# Patient Record
Sex: FEMALE | Race: WHITE
[De-identification: names, ages, dates, MRNs, and addresses within clinical notes are randomized per-mention and may not be internally consistent; named-entity substitution may affect disease eponyms.]

---

## 2017-06-08 ENCOUNTER — HOSPITAL ENCOUNTER (OUTPATIENT)
Dept: HOSPITAL 35 - HYPER | Age: 77
End: 2017-06-08
Attending: EMERGENCY MEDICINE
Payer: COMMERCIAL

## 2017-06-08 DIAGNOSIS — R60.9: ICD-10-CM

## 2017-06-08 DIAGNOSIS — I10: ICD-10-CM

## 2017-06-08 DIAGNOSIS — L97.821: ICD-10-CM

## 2017-06-08 DIAGNOSIS — Z72.89: ICD-10-CM

## 2017-06-08 DIAGNOSIS — I87.2: Primary | ICD-10-CM

## 2017-06-19 ENCOUNTER — HOSPITAL ENCOUNTER (OUTPATIENT)
Dept: HOSPITAL 35 - HYPER | Age: 77
End: 2017-06-19
Attending: EMERGENCY MEDICINE
Payer: COMMERCIAL

## 2017-06-19 DIAGNOSIS — L97.821: ICD-10-CM

## 2017-06-19 DIAGNOSIS — I10: ICD-10-CM

## 2017-06-19 DIAGNOSIS — Z72.89: ICD-10-CM

## 2017-06-19 DIAGNOSIS — I87.2: Primary | ICD-10-CM

## 2017-07-03 ENCOUNTER — HOSPITAL ENCOUNTER (OUTPATIENT)
Dept: HOSPITAL 35 - HYPER | Age: 77
End: 2017-07-03
Attending: PREVENTIVE MEDICINE
Payer: COMMERCIAL

## 2017-07-03 DIAGNOSIS — I10: ICD-10-CM

## 2017-07-03 DIAGNOSIS — L97.321: ICD-10-CM

## 2017-07-03 DIAGNOSIS — L97.821: ICD-10-CM

## 2017-07-03 DIAGNOSIS — Z72.89: ICD-10-CM

## 2017-07-03 DIAGNOSIS — Z90.710: ICD-10-CM

## 2017-07-03 DIAGNOSIS — Z96.653: ICD-10-CM

## 2017-07-03 DIAGNOSIS — I87.2: Primary | ICD-10-CM

## 2017-07-24 ENCOUNTER — HOSPITAL ENCOUNTER (OUTPATIENT)
Dept: HOSPITAL 35 - HYPER | Age: 77
End: 2017-07-24
Attending: PREVENTIVE MEDICINE
Payer: COMMERCIAL

## 2017-07-24 DIAGNOSIS — Z90.710: ICD-10-CM

## 2017-07-24 DIAGNOSIS — Z96.653: ICD-10-CM

## 2017-07-24 DIAGNOSIS — L97.321: ICD-10-CM

## 2017-07-24 DIAGNOSIS — I87.2: Primary | ICD-10-CM

## 2017-07-24 DIAGNOSIS — I10: ICD-10-CM

## 2017-08-14 ENCOUNTER — HOSPITAL ENCOUNTER (OUTPATIENT)
Dept: HOSPITAL 35 - HYPER | Age: 77
End: 2017-08-14
Attending: PREVENTIVE MEDICINE
Payer: COMMERCIAL

## 2017-08-14 DIAGNOSIS — L97.321: ICD-10-CM

## 2017-08-14 DIAGNOSIS — R60.9: ICD-10-CM

## 2017-08-14 DIAGNOSIS — I87.2: Primary | ICD-10-CM

## 2017-08-14 DIAGNOSIS — I10: ICD-10-CM

## 2017-08-14 DIAGNOSIS — Z72.89: ICD-10-CM

## 2017-08-14 DIAGNOSIS — Z90.710: ICD-10-CM

## 2017-09-11 ENCOUNTER — HOSPITAL ENCOUNTER (OUTPATIENT)
Dept: HOSPITAL 35 - HYPER | Age: 77
End: 2017-09-11
Attending: PREVENTIVE MEDICINE
Payer: COMMERCIAL

## 2017-09-11 DIAGNOSIS — L97.321: ICD-10-CM

## 2017-09-11 DIAGNOSIS — Z72.89: ICD-10-CM

## 2017-09-11 DIAGNOSIS — I10: ICD-10-CM

## 2017-09-11 DIAGNOSIS — Z96.653: ICD-10-CM

## 2017-09-11 DIAGNOSIS — Z90.710: ICD-10-CM

## 2017-09-11 DIAGNOSIS — I87.2: Primary | ICD-10-CM

## 2019-01-26 ENCOUNTER — HOSPITAL ENCOUNTER (EMERGENCY)
Dept: HOSPITAL 63 - ER | Age: 79
Discharge: HOME | End: 2019-01-26
Payer: MEDICARE

## 2019-01-26 VITALS — DIASTOLIC BLOOD PRESSURE: 63 MMHG | SYSTOLIC BLOOD PRESSURE: 126 MMHG

## 2019-01-26 VITALS — WEIGHT: 135 LBS | BODY MASS INDEX: 24.84 KG/M2 | HEIGHT: 62 IN

## 2019-01-26 DIAGNOSIS — R21: Primary | ICD-10-CM

## 2019-01-26 PROCEDURE — 99283 EMERGENCY DEPT VISIT LOW MDM: CPT

## 2019-01-26 NOTE — ED.ADGEN
Adult General


Chief Complaint


Chief Complaint


skin rash





HPI


HPI


Patient is a 78 year old female presents with ocular rash/rash. Patient had 

symptoms first appeared to 3 weeks week ago and was seen by her PCP placed on 

prednisone taper and was subsequently seen by her ophthalmologist. Rash  

resolved but then returned after completing his own. Patient denies pain, 

itching, change of vision. No systemic symptoms. No new medications or change 

medications other than prednisone. No other acute symptoms or complaints.[]





Review of Systems


Review of Systems


Review symptoms as per history of present illness. All other review symptoms 

are negative


All other systems were reviewed and found to be within normal limits, except as 

documented in this note.





Current Medications


Current Medications





Current Medications








 Medications


  (Trade)  Dose


 Ordered  Sig/Melecio  Start Time


 Stop Time Status Last Admin


Dose Admin


 


 Prednisone


  (Prednisone)  60 mg  1X  ONCE  1/26/19 17:15


 1/26/19 17:16   














Allergies


Allergies





Allergies








Coded Allergies Type Severity Reaction Last Updated Verified


 


  No Known Drug Allergies    1/26/19 No











Physical Exam


Physical Exam





Constitutional: Well developed, well nourished, no acute distress, non-toxic 

appearance. []


HENT: Normocephalic, atraumatic, bilateral external ears normal, oropharynx 

moist, no oral exudates, nose normal. []


Eyes: PERRLA, EOMI, bilateral periocular edematous rash with minimal 

induration. No weeping or drainage. No ocular pain with eye movement. [] 


Neck: Normal range of motion, no tenderness, supple, no stridor. [] 


Cardiovascular:Heart rate regular rhythm, no murmur []


Lungs & Thorax:  Bilateral breath sounds clear to auscultation [)


Extremities: No tenderness, no cyanosis, no clubbing, ROM intact, no edema. [] 


Neurologic: Alert and oriented X 3, normal motor function, normal sensory 

function, no focal deficits noted. []


Psychologic: Affect normal, judgement normal, mood normal. []





Current Patient Data


Vital Signs





 Vital Signs








  Date Time  Temp Pulse Resp B/P (MAP) Pulse Ox O2 Delivery O2 Flow Rate FiO2


 


1/26/19 16:40 97.5 65 16  98 Room Air  











EKG


EKG


[]





Radiology/Procedures


Radiology/Procedures


[]





Course & Med Decision Making


Course & Med Decision Making


Pertinent Labs and Imaging studies reviewed. (See chart for details)





[Periocular rash without ocular involvement. Patient asymptomatic in the 

emergency department. Will resume steroids and have follow-up with PCP for 

further management.]





Final Impression


Final Impression


[]1. Facial rash





Dragon Disclaimer


Dragon Disclaimer


This electronic medical record was generated, in whole or in part, using a 

voice recognition dictation system.











IVANNA ESPINAL DO Jan 26, 2019 16:42